# Patient Record
Sex: FEMALE | Race: WHITE | NOT HISPANIC OR LATINO | Employment: FULL TIME | URBAN - METROPOLITAN AREA
[De-identification: names, ages, dates, MRNs, and addresses within clinical notes are randomized per-mention and may not be internally consistent; named-entity substitution may affect disease eponyms.]

---

## 2021-04-06 ENCOUNTER — TELEPHONE (OUTPATIENT)
Dept: NEUROSURGERY | Facility: CLINIC | Age: 46
End: 2021-04-06

## 2021-04-26 ENCOUNTER — TELEPHONE (OUTPATIENT)
Dept: NEUROSURGERY | Facility: CLINIC | Age: 46
End: 2021-04-26

## 2021-04-26 NOTE — TELEPHONE ENCOUNTER
LM for patient that we had to move her appt with Dr. Carbajal to September 22 at 1:00. It was originally scheduled incorrectly so it was moved to a new patient slot. Also mailed out appt information.

## 2023-02-10 ENCOUNTER — PRE-PROCEDURE SCREENING (OUTPATIENT)
Dept: GASTROENTEROLOGY | Facility: CLINIC | Age: 48
End: 2023-02-10
Payer: COMMERCIAL

## 2023-02-10 NOTE — TELEPHONE ENCOUNTER
QUESTIONNAIRE SCEEENING IN MEDIA &   HAS BEEN SENT TO DOCTOR FOR REVIEW

## 2023-02-13 ENCOUNTER — PREP FOR SURGERY (OUTPATIENT)
Dept: OTHER | Facility: HOSPITAL | Age: 48
End: 2023-02-13
Payer: COMMERCIAL

## 2023-02-13 DIAGNOSIS — Z80.0 FH: COLON CANCER: Primary | ICD-10-CM

## 2023-02-15 ENCOUNTER — TELEPHONE (OUTPATIENT)
Dept: GASTROENTEROLOGY | Facility: CLINIC | Age: 48
End: 2023-02-15

## 2023-02-15 NOTE — TELEPHONE ENCOUNTER
Caller: Michelle Wall    Relationship to patient: Self    Best call back number: [phone]    Patient is needing: PATIENT CALLED IN TO SCHEDULE HER PROCEDURE. SHE WOULD LIKE A CALL BACK TO GET THIS SCHEDULED PLEASE.

## 2023-02-27 NOTE — TELEPHONE ENCOUNTER
Hub staff attempted to follow warm transfer process and was unsuccessful     Caller: Michelle Wall    Relationship to patient: Self    Best call back number: [phone]    Patient is needing: TO SCHEDULE COLONOSCOPY

## 2023-03-01 ENCOUNTER — TELEPHONE (OUTPATIENT)
Dept: GASTROENTEROLOGY | Facility: CLINIC | Age: 48
End: 2023-03-01

## 2023-03-01 NOTE — TELEPHONE ENCOUNTER
Caller: Michelle Wall    Relationship to patient: Self    Best call back number: [phone]  CAN CALL ANY TIME. CAN LEAVE VOICEMAIL LETTING PATIENT KNOW WHEN COLONOSCOPY IS SCHEDULED.     Patient is needing: PATIENT NEEDS TO BE SCHEDULED FOR A COLONOSCOPY WITH DR. FIGUEREDO.    PATIENT PREFERS Friday MORNINGS OR Monday MORNINGS. PATIENT NOT AVAILABLE 'SPRING BREAK WEEK'.

## 2023-03-15 NOTE — TELEPHONE ENCOUNTER
Hub staff attempted to follow warm transfer process and was unsuccessful     Caller: Michelle Wall    Relationship to patient: Self    Best call back number: [phone]    Patient is needing: PT CALLED TO SCHEDULE PROCEDURE BEST TO REACH AT 8AM, BUT OKAY TO CALL AT ANYTIME.

## 2023-03-21 NOTE — TELEPHONE ENCOUNTER
Hub staff attempted to follow warm transfer process and was unsuccessful     Caller: Michelle Wall    Relationship to patient: Self    Best call back number: [phone]    Patient is needing: PT CALLED TO SCHEDULE COLONOSCOPY. PT HAS BEEN CALLING FOR A WHILE. PLEASE CALL TO SCHEDULE.

## 2023-03-22 ENCOUNTER — TELEPHONE (OUTPATIENT)
Dept: GASTROENTEROLOGY | Facility: CLINIC | Age: 48
End: 2023-03-22
Payer: COMMERCIAL

## 2023-03-22 NOTE — TELEPHONE ENCOUNTER
RAD patient via telephone for. Scheduled COLONOSCOPY June 23/2023 with arrival time of PREMIER WILL CALL WITH TIME. Prep paperwork mailed to verified address on file. Patient advised arrival time may change based on [facility] guidelines.

## 2023-03-22 NOTE — TELEPHONE ENCOUNTER
RAD patient via telephone for. Scheduled COLONOSCOPY June 23, 2023 with arrival time of PREMIER WILL CALL WITH TIME. Prep paperwork mailed to verified address on file. Patient advised arrival time may change based on [facility] guidelines.

## 2024-11-06 ENCOUNTER — TELEPHONE (OUTPATIENT)
Dept: GASTROENTEROLOGY | Facility: CLINIC | Age: 49
End: 2024-11-06
Payer: COMMERCIAL

## 2024-11-06 NOTE — TELEPHONE ENCOUNTER
Provider: DR FIGUEREDO    Caller: TAMICA AGUILA    Relationship to Patient: SELF    Pharmacy:     Phone Number: [phone]    Reason for Call: PT CALLED TO FIND OUT IF DR FIGUEREDO WILL SEE FOR ESOPHAGEAL MOTILITY DISORDER.     PLEASE CALL TO ADVISE

## 2024-11-07 NOTE — TELEPHONE ENCOUNTER
Called pt and passed on Dr Armas's recommendations  I have scheduled her to see Marianela  next week

## 2024-11-11 ENCOUNTER — OFFICE VISIT (OUTPATIENT)
Dept: GASTROENTEROLOGY | Facility: CLINIC | Age: 49
End: 2024-11-11
Payer: COMMERCIAL

## 2024-11-11 VITALS
HEIGHT: 65 IN | BODY MASS INDEX: 19.18 KG/M2 | WEIGHT: 115.1 LBS | HEART RATE: 70 BPM | DIASTOLIC BLOOD PRESSURE: 71 MMHG | SYSTOLIC BLOOD PRESSURE: 107 MMHG

## 2024-11-11 DIAGNOSIS — R13.10 DYSPHAGIA, UNSPECIFIED TYPE: Primary | ICD-10-CM

## 2024-11-11 DIAGNOSIS — R93.3 ABNORMAL ESOPHAGRAM: ICD-10-CM

## 2024-11-11 RX ORDER — SACCHAROMYCES BOULARDII 250 MG
CAPSULE ORAL
COMMUNITY

## 2024-11-11 RX ORDER — PANTOPRAZOLE SODIUM 20 MG/1
20 TABLET, DELAYED RELEASE ORAL DAILY
Qty: 90 TABLET | Refills: 1 | Status: SHIPPED | OUTPATIENT
Start: 2024-11-11

## 2024-11-11 RX ORDER — CETIRIZINE HYDROCHLORIDE 10 MG/1
TABLET ORAL
COMMUNITY
Start: 2024-11-01

## 2024-11-11 RX ORDER — MULTIVIT WITH MINERALS/LUTEIN
TABLET ORAL
COMMUNITY
Start: 2010-11-08

## 2024-11-11 NOTE — PROGRESS NOTES
"Chief Complaint  Difficulty Swallowing    Subjective          History of Present Illness    Michelle Wall is a  49 y.o. female presents for follow-up.  She is a patient of Dr. Armas and is new to me.    6/23/2023 colonoscopy showed nonbleeding internal hemorrhoids, otherwise normal exam.  Repeat colonoscopy in 5 years for screening.    Patient reports that for several years she has had intermittent difficulty swallowing.  This has worsened over the last several months.  She says that she feels like she has to eat very slowly or drink a lot of water with eating or her food will get stuck in her throat.  She cannot identify a specific type of food that this is worse with.  She denies any acid reflux.  No unintentional weight loss.    History of brother and father needing esophageal dilation.     Family history of colon cancer in her father.    Objective   Vital Signs:   /71 (BP Location: Left arm, Patient Position: Sitting, Cuff Size: Adult)   Pulse 70   Ht 165.1 cm (65\")   Wt 52.2 kg (115 lb 1.6 oz)   BMI 19.15 kg/m²       Physical Exam  Constitutional:       General: She is not in acute distress.     Appearance: Normal appearance.   Eyes:      General: No scleral icterus.  Cardiovascular:      Rate and Rhythm: Normal rate.   Pulmonary:      Effort: Pulmonary effort is normal.   Abdominal:      General: Abdomen is flat. There is no distension.      Tenderness: There is no abdominal tenderness. There is no guarding.   Skin:     Coloration: Skin is not jaundiced.   Neurological:      General: No focal deficit present.      Mental Status: She is alert and oriented to person, place, and time.   Psychiatric:         Mood and Affect: Mood normal.         Behavior: Behavior normal.          Result Review :   The following data was reviewed by: Marianela Huertas PA-C on 11/11/2024:              Assessment:   Diagnoses and all orders for this visit:    1. Dysphagia, unspecified type (Primary)  -     pantoprazole " (Protonix) 20 MG EC tablet; Take 1 tablet by mouth Daily.  Dispense: 90 tablet; Refill: 1  -     Case Request; Standing  -     Implement Anesthesia orders day of procedure.; Standing  -     Case Request    2. Abnormal esophagram  -     pantoprazole (Protonix) 20 MG EC tablet; Take 1 tablet by mouth Daily.  Dispense: 90 tablet; Refill: 1  -     Case Request; Standing  -     Implement Anesthesia orders day of procedure.; Standing  -     Case Request          Plan:   -Will plan for EGD for further evaluation of worsening dysphagia. Recommend trial of pantoprazole 20 mg 1 tablet daily in the meantime.  -Recommend avoiding spicy/acidic foods, alcohol, coffee, caffeine, chocolate  -Due for next screening colonoscopy 2028      Follow Up   Return for EGD.    Dragon dictation used throughout this note.         Marianela Huertas PA-C  [facility]  [address] [place] [address]  Office: [phone]

## 2024-11-12 ENCOUNTER — TELEPHONE (OUTPATIENT)
Dept: GASTROENTEROLOGY | Facility: CLINIC | Age: 49
End: 2024-11-12
Payer: COMMERCIAL

## 2024-11-12 NOTE — TELEPHONE ENCOUNTER
[facility] - FOR [facility] WITH PROVIDER - [facility] WILL CALL WITH ARRIVE TIME A WEEK PRIOR - DATE IS   EGD 12/06/2024

## 2024-12-06 ENCOUNTER — TELEPHONE (OUTPATIENT)
Dept: GASTROENTEROLOGY | Facility: CLINIC | Age: 49
End: 2024-12-06

## 2024-12-06 ENCOUNTER — LAB REQUISITION (OUTPATIENT)
Dept: LAB | Facility: HOSPITAL | Age: 49
End: 2024-12-06
Payer: COMMERCIAL

## 2024-12-06 ENCOUNTER — OUTSIDE FACILITY SERVICE (OUTPATIENT)
Dept: GASTROENTEROLOGY | Facility: CLINIC | Age: 49
End: 2024-12-06
Payer: COMMERCIAL

## 2024-12-06 DIAGNOSIS — R13.10 DYSPHAGIA, UNSPECIFIED TYPE: Primary | ICD-10-CM

## 2024-12-06 DIAGNOSIS — R13.10 DYSPHAGIA, UNSPECIFIED TYPE: ICD-10-CM

## 2024-12-06 PROCEDURE — 88305 TISSUE EXAM BY PATHOLOGIST: CPT | Performed by: INTERNAL MEDICINE

## 2024-12-06 NOTE — TELEPHONE ENCOUNTER
Please let her know that biopsies will return within a week - will contact her with those results.  I have ordered a video swallow - hosp should call to set that up - will arrange for f/u after completion of testing.  You could give her number to schedule video swallow to expedite scheduling as she is trying to have that done before year end

## 2024-12-06 NOTE — TELEPHONE ENCOUNTER
I have called the pt and passed on Dr Armas's recommendations and the pt verbalized understanding and is agreeable to plan

## 2024-12-09 LAB
CYTO UR: NORMAL
LAB AP CASE REPORT: NORMAL
PATH REPORT.FINAL DX SPEC: NORMAL
PATH REPORT.GROSS SPEC: NORMAL

## 2024-12-11 ENCOUNTER — TELEPHONE (OUTPATIENT)
Dept: GASTROENTEROLOGY | Facility: CLINIC | Age: 49
End: 2024-12-11
Payer: COMMERCIAL

## 2024-12-11 NOTE — TELEPHONE ENCOUNTER
----- Message from Lyndsey Armas sent at 12/11/2024  2:22 PM EST -----  Mild nonspecific inflammation seen on esophageal biopsy.  This is likely reflux related given location.  Recommend trial omeprazole.  Schedule video swallow to further evaluate swallowing difficulties.  Office f/u in 6 - 8 weeks

## 2024-12-11 NOTE — PROGRESS NOTES
Mild nonspecific inflammation seen on esophageal biopsy.  This is likely reflux related given location.  Recommend trial omeprazole.  Schedule video swallow to further evaluate swallowing difficulties.  Office f/u in 6 - 8 weeks

## 2024-12-11 NOTE — TELEPHONE ENCOUNTER
Ok for the hub to relay and schedule.  Called the pt to schedule a 6-8 week follow up with Marianela Huertas per Dr Armas.  Pt was driving and could not make the appt at this time.

## 2024-12-30 ENCOUNTER — TELEPHONE (OUTPATIENT)
Dept: GASTROENTEROLOGY | Facility: CLINIC | Age: 49
End: 2024-12-30
Payer: COMMERCIAL

## 2024-12-30 NOTE — TELEPHONE ENCOUNTER
My apologies-I do not think that I was aware that she had already had this done at [facility] when I ordered it at [facility].  I have reviewed it.    Results as follows per the [facility] report - Pt presents with a functional oropharyngeal swallow. No aspiration or penetration. Trace residue within the pyriforms and below the UES that cleared with liquid wash. AP view completed with poor clearance of puree through LES with stasis remaining at mid to lower esophagus. Recommend further work up    If her swallowing issues persist, recommend office follow-up to discuss esophageal manometry to further evaluate her swallowing as the EGD failed to show any obstructive cause

## 2024-12-31 NOTE — TELEPHONE ENCOUNTER
Called pt and advised of Dr Chamberlain' note. Verb understanding.     Pt reports she is doing better and wants to hold off on appt for now. She reports that she started the omeprazole and was not nauseated but all her children had gi bug  and she was not sure if it were the med or the gi bug.  Pt states she will try omeprazole again and if no further improvement will make appt.  Update sent to Dr Armas.